# Patient Record
Sex: MALE | Race: OTHER | NOT HISPANIC OR LATINO | URBAN - METROPOLITAN AREA
[De-identification: names, ages, dates, MRNs, and addresses within clinical notes are randomized per-mention and may not be internally consistent; named-entity substitution may affect disease eponyms.]

---

## 2023-03-08 PROBLEM — A74.9 CHLAMYDIA: Status: ACTIVE | Noted: 2023-03-08

## 2023-03-08 PROBLEM — E78.5 DYSLIPIDEMIA: Status: ACTIVE | Noted: 2023-03-08

## 2023-03-08 PROBLEM — D22.30 ATYPICAL NEVUS OF FACE: Status: ACTIVE | Noted: 2023-03-08
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
## 2023-03-08 PROBLEM — D22.71: Status: ACTIVE | Noted: 2023-03-08

## 2023-03-08 PROBLEM — G83.9 PARESIS (MULTI): Status: ACTIVE | Noted: 2023-03-08

## 2023-03-08 PROBLEM — E55.9 VITAMIN D DEFICIENCY: Status: ACTIVE | Noted: 2023-03-08

## 2023-03-08 PROBLEM — R20.2 PARESTHESIA OF FINGER: Status: ACTIVE | Noted: 2023-03-08

## 2023-03-13 ENCOUNTER — OFFICE VISIT (OUTPATIENT)
Dept: PRIMARY CARE | Facility: CLINIC | Age: 18
End: 2023-03-13
Payer: COMMERCIAL

## 2023-03-13 VITALS
RESPIRATION RATE: 18 BRPM | OXYGEN SATURATION: 97 % | HEART RATE: 80 BPM | SYSTOLIC BLOOD PRESSURE: 110 MMHG | TEMPERATURE: 98.1 F | HEIGHT: 70 IN | DIASTOLIC BLOOD PRESSURE: 60 MMHG | WEIGHT: 182.13 LBS | BODY MASS INDEX: 26.07 KG/M2

## 2023-03-13 DIAGNOSIS — Z00.00 HEALTH MAINTENANCE EXAMINATION: Primary | ICD-10-CM

## 2023-03-13 DIAGNOSIS — L72.0 INCLUSION CYST: ICD-10-CM

## 2023-03-13 PROBLEM — G83.9 PARESIS (MULTI): Status: RESOLVED | Noted: 2023-03-08 | Resolved: 2023-03-13

## 2023-03-13 PROCEDURE — 1036F TOBACCO NON-USER: CPT | Performed by: FAMILY MEDICINE

## 2023-03-13 PROCEDURE — 99385 PREV VISIT NEW AGE 18-39: CPT | Performed by: FAMILY MEDICINE

## 2023-03-13 NOTE — PROGRESS NOTES
Subjective   Patient ID: Glenn Spears is a 18 y.o. male who presents for Establish Care and bulge.    [facility] care here for physical exam and checkup  Last PCP -- Danny Carvalho   Reason for leaving -- Age   How did you hear about us -- Mom is a patient of Dr. Cesar     Patient presents in office today for a bulge on the right side of his abdomen   This has been ongoing x 1 week   Denies any pain   Admits that it does cause his nausea   Admits that he has had a left inguinal hernia in the past   Does occasionally feel pulling in this area when he works out.  He has been working on body building.  He does take Testosterone pills.  Does not take Creatine daily.    Review of systems; Patient seen today for exam denies any problems with headaches or vision, denies any shortness of breath chest pain nausea or vomiting, no black stool no blood in the stool no heartburn type symptoms denies any problems with constipation or diarrhea, and no dysuria-type symptoms    The patient's allergies medications were reviewed with them today    The patient's social family and surgical history or also reviewed here today, along with her past medical history.     Objective     Alert and active in  no acute distress  HEENT TMs clear oropharynx negative nares clear no drainage noted neck supple  With no adenopathy   Heart regular rate and rhythm without murmur and no carotid bruits  Lungs- clear to auscultation bilaterally, no wheeze or rhonchi noted  Thyroid -negative masses or nodularity  Abdomen- soft times four quadrants, bowel sounds positive no masses or organomegaly, negative tenderness guarding or rebound  Neurological exam unremarkable- DTRs in upper and lower extremities within normal limits.   skin -no lesions noted    No inguinal hernia present.  No masses    Abdomen - probably cyst.//Small 8 mm x 5 mm lesion on the right upper sixpack      /60 (BP Location: Right arm, Patient Position: Sitting, BP Cuff Size:  "Adult)   Pulse 80   Temp 36.7 °C (98.1 °F)   Resp 18   Ht 1.778 m (5' 10\")   Wt 82.6 kg (182 lb 2 oz)   SpO2 97%   BMI 26.13 kg/m²     No Known Allergies    Assessment/Plan   Problem List Items Addressed This Visit    None  Visit Diagnoses       Health maintenance examination    -  Primary    Relevant Orders    CBC and Auto Differential    Comprehensive metabolic panel    Lipid panel    Inclusion cyst              We can refer him to a plastic surgeon for removal, if this continues to bother him.  As he is been bodybuilding for years, and may try to make occur out of this    Do not recommend he takes Creatine daily.    He can try using heat in the evening, see if this helps.    Labs ordered today. We will contact him with results.  (CBC, CMP, Lipid)    If anything worsens or changes please call us at once, follow up in the office as planned.    Scribe Attestation  By signing my name below, I, Ifeoma Flanagan MA , Scribe   attest that this documentation has been prepared under the direction and in the presence of Hitesh Niño DO.    "

## 2023-05-24 ENCOUNTER — OFFICE VISIT (OUTPATIENT)
Dept: PRIMARY CARE | Facility: CLINIC | Age: 18
End: 2023-05-24
Payer: COMMERCIAL

## 2023-05-24 VITALS
SYSTOLIC BLOOD PRESSURE: 118 MMHG | RESPIRATION RATE: 18 BRPM | OXYGEN SATURATION: 97 % | BODY MASS INDEX: 25.85 KG/M2 | TEMPERATURE: 97.7 F | HEART RATE: 87 BPM | WEIGHT: 180.13 LBS | DIASTOLIC BLOOD PRESSURE: 60 MMHG

## 2023-05-24 DIAGNOSIS — Z11.3 SCREENING FOR STD (SEXUALLY TRANSMITTED DISEASE): ICD-10-CM

## 2023-05-24 PROCEDURE — 1036F TOBACCO NON-USER: CPT | Performed by: FAMILY MEDICINE

## 2023-05-24 PROCEDURE — 99213 OFFICE O/P EST LOW 20 MIN: CPT | Performed by: FAMILY MEDICINE

## 2023-05-24 NOTE — PROGRESS NOTES
Subjective   Patient ID: Glenn Spears is a 18 y.o. male who presents for std testing.    HPI  Pt established with Dr. Niño on 3/13/2023  Has switched providers  Pt would like STD testing.  Is sexually active.  Denies exposure  Denies multiple partners  Pt does not use protection        Review of Systems  Constitutional:  no chills, no fever and no night sweats.  Eyes: no blurred vision and no eyesight problems.  ENT: no hearing loss, no nasal congestion, no hoarseness and no sore throat.  Neck: no mass (es) and no swelling.  Cardiovascular: no chest pain, no intermittent leg claudication, no lower extremity edema, no palpitation and no syncope.  Respiratory: no cough, no shortness of breath during exertion, no shortness of breath at rest and no wheezing.  Gastrointestinal: no abdominal pain, no blood in stools, no constipation, no diarrhea, no melena, no nausea, no rectal pain and no vomiting.  Genitourinary: no dysuria, no change in urinary frequency, no urinary hesitancy and no feelings of urinary urgency.  Musculoskeletal: no arthralgias, no back pain and no myalgias.  Integumentary: no new skin lesions and no rashes.  Neurological: no difficulty walking, no headache, no limb weakness, no numbness and no tingling.  Psychiatric/Behavioral: no anxiety, no depression, no anhedonia and no substance use disorders.  Endocrine: no recent weight gain and no recent weight loss.  Hematologic/Lymphatic: no tendency for easy bruising and no swollen glands    Objective   Physical Exam  Patient in with statement of having multiple sexual partners does not wear condoms and is aware of the risks.  Recommend that he do that to protect against 60 transmitted diseases some of which could be potentially life-threatening he has no specific symptoms no penile discharge no urethritis no fever no chills would like to get STD testing done.  /60   Pulse 87   Temp 36.5 °C (97.7 °F)   Resp 18   Wt 81.7 kg (180 lb 2 oz)    SpO2 97%   BMI 25.85 kg/m²     Lab Results   Component Value Date    WBC 4.9 02/16/2022    HGB 15.3 02/16/2022    HCT 47.9 02/16/2022    MCV 96 02/16/2022     02/16/2022       Assessment/Plan STD testing follow-up will have the results  Problem List Items Addressed This Visit    None  Visit Diagnoses       Screening for STD (sexually transmitted disease)

## 2024-02-27 ENCOUNTER — OFFICE VISIT (OUTPATIENT)
Dept: PRIMARY CARE | Facility: CLINIC | Age: 19
End: 2024-02-27
Payer: COMMERCIAL

## 2024-02-27 VITALS
SYSTOLIC BLOOD PRESSURE: 102 MMHG | BODY MASS INDEX: 27.25 KG/M2 | HEIGHT: 69 IN | HEART RATE: 76 BPM | OXYGEN SATURATION: 98 % | TEMPERATURE: 97.3 F | WEIGHT: 184 LBS | DIASTOLIC BLOOD PRESSURE: 58 MMHG | RESPIRATION RATE: 16 BRPM

## 2024-02-27 DIAGNOSIS — J02.0 STREP PHARYNGITIS: Primary | ICD-10-CM

## 2024-02-27 DIAGNOSIS — J02.9 SORE THROAT: ICD-10-CM

## 2024-02-27 DIAGNOSIS — R05.9 COUGH, UNSPECIFIED TYPE: ICD-10-CM

## 2024-02-27 LAB — POC RAPID STREP: POSITIVE

## 2024-02-27 PROCEDURE — 99213 OFFICE O/P EST LOW 20 MIN: CPT | Performed by: FAMILY MEDICINE

## 2024-02-27 PROCEDURE — 87880 STREP A ASSAY W/OPTIC: CPT | Performed by: FAMILY MEDICINE

## 2024-02-27 PROCEDURE — 1036F TOBACCO NON-USER: CPT | Performed by: FAMILY MEDICINE

## 2024-02-27 RX ORDER — AMOXICILLIN 875 MG/1
875 TABLET, FILM COATED ORAL 2 TIMES DAILY
Qty: 20 TABLET | Refills: 0 | Status: SHIPPED | OUTPATIENT
Start: 2024-02-27 | End: 2024-03-08

## 2024-02-27 NOTE — PROGRESS NOTES
Subjective   Patient ID: Glenn Spears is a 19 y.o. male who presents for No chief complaint on file..  HPI  Pt is being seen for possible strep throat  Ongoing for 3 days  Other symptoms include sinus issues, cough, sore throat  No fever or chill   Did not test covid  Pt is not taking any OTC at his time     No other concern /question   Review of Systems  Constitutional:  no chills, no fever and no night sweats.  Eyes: no blurred vision and no eyesight problems.  ENT: no hearing loss, no nasal congestion, no hoarseness and no sore throat.  Neck: no mass (es) and no swelling.  Cardiovascular: no chest pain, no intermittent leg claudication, no lower extremity edema, no palpitation and no syncope.  Respiratory: no cough, no shortness of breath during exertion, no shortness of breath at rest and no wheezing.  Gastrointestinal: no abdominal pain, no blood in stools, no constipation, no diarrhea, no melena, no nausea, no rectal pain and no vomiting.  Genitourinary: no dysuria, no change in urinary frequency, no urinary hesitancy and no feelings of urinary urgency.  Musculoskeletal: no arthralgias, no back pain and no myalgias.  Integumentary: no new skin lesions and no rashes.  Neurological: no difficulty walking, no headache, no limb weakness, no numbness and no tingling.  Psychiatric/Behavioral: no anxiety, no depression, no anhedonia and no substance use disorders.  Endocrine: no recent weight gain and no recent weight loss.  Hematologic/Lymphatic: no tendency for easy bruising and no swollen glands    Objective   Physical Exam  Developed well-nourished muscular male in no acute distress states she had a severe throat sore throat yesterday whitish material at the back throat mom is concerned that he had strep some people at school that it exam his pharynx inflamed adenopathy with exudate no sinus tenderness neck is supple lungs are clear cardiac and abdominal exams are benign rapid strep is positive.  Assessment  strep pharyngitis plan Amoxil 875 twice daily for 10 days and follow-up if not improving  There were no vitals taken for this visit.    Lab Results   Component Value Date    WBC 4.9 02/16/2022    HGB 15.3 02/16/2022    HCT 47.9 02/16/2022    MCV 96 02/16/2022     02/16/2022       Assessment/Plan   Problem List Items Addressed This Visit    None